# Patient Record
Sex: FEMALE | Race: OTHER | Employment: FULL TIME | URBAN - METROPOLITAN AREA
[De-identification: names, ages, dates, MRNs, and addresses within clinical notes are randomized per-mention and may not be internally consistent; named-entity substitution may affect disease eponyms.]

---

## 2020-08-21 ENCOUNTER — APPOINTMENT (OUTPATIENT)
Dept: GENERAL RADIOLOGY | Age: 50
End: 2020-08-21
Attending: NURSE PRACTITIONER
Payer: COMMERCIAL

## 2020-08-21 ENCOUNTER — APPOINTMENT (OUTPATIENT)
Dept: CT IMAGING | Age: 50
End: 2020-08-21
Attending: NURSE PRACTITIONER
Payer: COMMERCIAL

## 2020-08-21 ENCOUNTER — HOSPITAL ENCOUNTER (EMERGENCY)
Age: 50
Discharge: HOME | End: 2020-08-21
Attending: EMERGENCY MEDICINE | Admitting: EMERGENCY MEDICINE
Payer: COMMERCIAL

## 2020-08-21 VITALS
TEMPERATURE: 99.2 F | SYSTOLIC BLOOD PRESSURE: 137 MMHG | RESPIRATION RATE: 14 BRPM | HEIGHT: 64 IN | HEART RATE: 83 BPM | WEIGHT: 160 LBS | BODY MASS INDEX: 27.31 KG/M2 | DIASTOLIC BLOOD PRESSURE: 72 MMHG | OXYGEN SATURATION: 100 %

## 2020-08-21 DIAGNOSIS — M54.6 ACUTE LEFT-SIDED THORACIC BACK PAIN: ICD-10-CM

## 2020-08-21 DIAGNOSIS — R51.9 NONINTRACTABLE HEADACHE, UNSPECIFIED CHRONICITY PATTERN, UNSPECIFIED HEADACHE TYPE: ICD-10-CM

## 2020-08-21 DIAGNOSIS — M25.512 PAIN IN JOINT OF LEFT SHOULDER: ICD-10-CM
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
## 2020-08-21 PROCEDURE — 74011000250 HC RX REV CODE- 250: Performed by: NURSE PRACTITIONER

## 2020-08-21 PROCEDURE — 70450 CT HEAD/BRAIN W/O DYE: CPT

## 2020-08-21 PROCEDURE — 71046 X-RAY EXAM CHEST 2 VIEWS: CPT

## 2020-08-21 PROCEDURE — 99284 EMERGENCY DEPT VISIT MOD MDM: CPT

## 2020-08-21 PROCEDURE — 74011250637 HC RX REV CODE- 250/637: Performed by: NURSE PRACTITIONER

## 2020-08-21 PROCEDURE — 72072 X-RAY EXAM THORAC SPINE 3VWS: CPT

## 2020-08-21 RX ORDER — LIDOCAINE 4 G/100G
PATCH TOPICAL
Qty: 10 PATCH | Refills: 0 | Status: SHIPPED | OUTPATIENT
Start: 2020-08-21

## 2020-08-21 RX ORDER — NAPROXEN 500 MG/1
500 TABLET ORAL 2 TIMES DAILY WITH MEALS
Qty: 20 TAB | Refills: 0 | Status: SHIPPED | OUTPATIENT
Start: 2020-08-21 | End: 2020-08-31

## 2020-08-21 RX ORDER — ACETAMINOPHEN 500 MG
1000 TABLET ORAL
Status: COMPLETED | OUTPATIENT
Start: 2020-08-21 | End: 2020-08-21

## 2020-08-21 RX ORDER — METHOCARBAMOL 750 MG/1
750 TABLET, FILM COATED ORAL 4 TIMES DAILY
Status: DISCONTINUED | OUTPATIENT
Start: 2020-08-21 | End: 2020-08-21

## 2020-08-21 RX ORDER — METHOCARBAMOL 750 MG/1
750 TABLET, FILM COATED ORAL 4 TIMES DAILY
Qty: 16 TAB | Refills: 0 | Status: SHIPPED | OUTPATIENT
Start: 2020-08-21 | End: 2020-08-25

## 2020-08-21 RX ORDER — METHOCARBAMOL 750 MG/1
750 TABLET, FILM COATED ORAL
Status: COMPLETED | OUTPATIENT
Start: 2020-08-21 | End: 2020-08-21

## 2020-08-21 RX ORDER — NAPROXEN 250 MG/1
500 TABLET ORAL
Status: COMPLETED | OUTPATIENT
Start: 2020-08-21 | End: 2020-08-21

## 2020-08-21 RX ORDER — LIDOCAINE 4 G/100G
1 PATCH TOPICAL EVERY 24 HOURS
Status: DISCONTINUED | OUTPATIENT
Start: 2020-08-21 | End: 2020-08-22 | Stop reason: HOSPADM

## 2020-08-21 RX ADMIN — NAPROXEN 500 MG: 250 TABLET ORAL at 20:50

## 2020-08-21 RX ADMIN — METHOCARBAMOL TABLETS 750 MG: 750 TABLET, COATED ORAL at 20:50

## 2020-08-21 RX ADMIN — ACETAMINOPHEN 1000 MG: 500 TABLET ORAL at 20:50
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
## 2020-08-22 NOTE — DISCHARGE INSTRUCTIONS
Take Tylenol 650-1000mg for headache   Apply ice for 20 min to aches and pains. For adult and child immunizations, family planning, TB screening, STD testing and women's health services. [place] [phone]     [place] [phone]     [place] [address] [facility] [phone]     [address] [place] [address]     For primary care services, woman and child wellness, and some clinics providing specialty care. [facility] -- [address] [facility]. [facility]5 [facility] [phone]   [address] [facility] 200 [facility] 3614 [facility] [phone]   [address] [facility] [address] [facility] [address] [phone]   [address] [facility]50  Community  [phone]   [address] [place] [address] [phone]   [address] [phone]   Flint Hashimoto [address] [phone]    [facility] [address] [phone], [facility] [phone]    [address] [phone]   [facility] [place] [address] [facility]  [phone]     [address] ([email or web address]) [phone]         Sexual Health/Woman Wellness Clinics    For STD/HIV testing and treatment, pregnancy testing and services, men's health, birth control services, LGBT services, and hepatitis/HPV vaccine services. [place] [employer] [place] for [place] [employer] [address] [facility] [address] [facility] [address] [facility] 1579 600 KEYONA Thakkar [phone]   [facility] [address], 5th floor [phone]   Pregnancy 3928 [facility] [address] for Women [address]  [address]enn[address] 1421 [facility] [address] [facility] [phone] 6655 [facility]   [phone]   [place]   [phone]   Women, Infant and Children's Services: [address] [phone]      600 [facility]   [phone]   [address]        [phone]   [address] Crisis   [address] [facility] [address] [phone]       Local Primary Care Physicians  [facility] [phone]  MD Blossom Patton MD Darnell Papa, MD [facility] [phone]  Chantelle Oliva, P  Alphonso Richter, MD Sheppard Arts, MD Doyal Gowers, MD [address] [address] [phone]  MD Juan Arnold MD [facility] [phone]  MD Jhoana Garcia MD Bentley Pan, MD Charlean Hosteller, MD   [facility] [phone]  VYBBALA LZKYIB LASHON, MD Alex Mckeon [facility], NP [address] [facility] [address] [phone]  MD Lisa Apodaca MD Lanny Skillern, MD Elease Cedar, MD Camille Barer, MD Gust Hoit, MD Missy Schmitt, MD   5[address] [facility] [phone]  Luis Leslie MD [facility] [phone]  MD Thor Saavedra, NP  MD Shira Najera MD Mammie Corti, MD Felix Piedra, MD Jodie Docker, MD   4[address] [facility] [phone]  MD Rajwinder Knox, P  Omer Gonzalez, MD Liz Espinoza MD Edmonia Space, MD Christen Churchman, MD [facility] [phone]  MD Mina North MD Harlow Henri, MD Martyn Sa, MD  Valeen Devante, MD   [address] [phone]  MD Jocelyne Baumaning, [address] [place] [address] [phone]  MD Stormy Saleem MD Roe Rankin, 5188 [facility] [phone]  [facility]o MD Cami Yañez MD Twanda Shaver, MD Gretta Canter, MD Ada Lezama, MD Astrid Dias, ASHISH Cabrera MD 1619 [facility]   [phone]  MD Doug Leslie MD Tilmon Clinch, MD   2027 [facility] [phone]  [address] [place] [address] MD Cherry Hinton, PRECIOUS Temple, MICH Temple, PRECIOUS [place], MD Jarrett Renteria, ASHISH Santos, DO Miscellaneous:  Svitlana Arellano [phone]

## 2023-05-21 RX ORDER — LIDOCAINE 4 G/G
PATCH TOPICAL
COMMUNITY
Start: 2020-08-21